# Patient Record
Sex: MALE | Race: AMERICAN INDIAN OR ALASKA NATIVE
[De-identification: names, ages, dates, MRNs, and addresses within clinical notes are randomized per-mention and may not be internally consistent; named-entity substitution may affect disease eponyms.]

---

## 2018-10-22 ENCOUNTER — HOSPITAL ENCOUNTER (EMERGENCY)
Dept: HOSPITAL 5 - ED | Age: 48
Discharge: HOME | End: 2018-10-22
Payer: SELF-PAY

## 2018-10-22 VITALS — DIASTOLIC BLOOD PRESSURE: 95 MMHG | SYSTOLIC BLOOD PRESSURE: 148 MMHG

## 2018-10-22 DIAGNOSIS — R10.12: Primary | ICD-10-CM

## 2018-10-22 DIAGNOSIS — R10.32: ICD-10-CM

## 2018-10-22 LAB
ALBUMIN SERPL-MCNC: 4.2 G/DL (ref 3.9–5)
ALT SERPL-CCNC: 14 UNITS/L (ref 7–56)
BACTERIA #/AREA URNS HPF: (no result) /HPF
BILIRUB UR QL STRIP: (no result)
BLOOD UR QL VISUAL: (no result)
BUN SERPL-MCNC: 13 MG/DL (ref 9–20)
BUN/CREAT SERPL: 10 %
CALCIUM SERPL-MCNC: 9.2 MG/DL (ref 8.4–10.2)
HCT VFR BLD CALC: 41.8 % (ref 35.5–45.6)
HEMOLYSIS INDEX: 6
HGB BLD-MCNC: 13.6 GM/DL (ref 11.8–15.2)
HYALINE CASTS #/AREA URNS LPF: 5 /LPF
MCH RBC QN AUTO: 30 PG (ref 28–32)
MCHC RBC AUTO-ENTMCNC: 33 % (ref 32–34)
MCV RBC AUTO: 93 FL (ref 84–94)
MUCOUS THREADS #/AREA URNS HPF: (no result) /HPF
PH UR STRIP: 5 [PH] (ref 5–7)
PLATELET # BLD: 218 K/MM3 (ref 140–440)
RBC # BLD AUTO: 4.5 M/MM3 (ref 3.65–5.03)
RBC #/AREA URNS HPF: 3 /HPF (ref 0–6)
UROBILINOGEN UR-MCNC: 2 MG/DL (ref ?–2)
WBC #/AREA URNS HPF: 1 /HPF (ref 0–6)

## 2018-10-22 PROCEDURE — 80053 COMPREHEN METABOLIC PANEL: CPT

## 2018-10-22 PROCEDURE — 99284 EMERGENCY DEPT VISIT MOD MDM: CPT

## 2018-10-22 PROCEDURE — 85027 COMPLETE CBC AUTOMATED: CPT

## 2018-10-22 PROCEDURE — 36415 COLL VENOUS BLD VENIPUNCTURE: CPT

## 2018-10-22 PROCEDURE — 74019 RADEX ABDOMEN 2 VIEWS: CPT

## 2018-10-22 PROCEDURE — 81001 URINALYSIS AUTO W/SCOPE: CPT

## 2018-10-22 NOTE — EMERGENCY DEPARTMENT REPORT
ED Abdominal Pain HPI





- General


Chief Complaint: Abdominal Pain


Stated Complaint: LEFT SIDE PAIN


Time Seen by Provider: 10/22/18 13:04


Source: patient


Mode of arrival: Ambulatory


Limitations: No Limitations





- History of Present Illness


MD Complaint: abdominal pain


Location: [facility]


Migration to: no migration


Severity: mild


Improves With: nothing


Worsens With: nothing


Associated Symptoms: nausea.  denies: vomiting, diarrhea, fever, chills, 

constipation, dysuria, hematemesis, hematochezia, melena, hematuria, anorexia





- Related Data


 Previous Rx's











 Medication  Instructions  Recorded  Last Taken  Type


 


Ondansetron [Zofran Odt] 4 mg PO Q6H PRN #10 tab.rapdis 10/22/18 Unknown Rx











 Allergies











Allergy/AdvReac Type Severity Reaction Status Date / Time


 


No Known Allergies Allergy   Unverified 10/22/18 12:01














ED Review of Systems


ROS: 


Stated complaint: LEFT SIDE PAIN


Other details as noted in HPI





Comment: All other systems reviewed and negative


Constitutional: denies: chills


Eyes: denies: eye pain


ENT: denies: throat pain


Respiratory: denies: cough


Cardiovascular: denies: chest pain


Endocrine: denies: excessive sweating


Gastrointestinal: as per HPI, abdominal pain, nausea.  denies: vomiting, 

diarrhea, constipation, hematemesis, melena


Genitourinary: denies: urgency, dysuria, frequency, hematuria, discharge


Musculoskeletal: denies: back pain


Skin: denies: rash, lesions


Neurological: denies: headache, weakness


Psychiatric: denies: anxiety, depression


Hematological/Lymphatic: denies: easy bleeding





ED Past Medical Hx





- Past Medical History


Previous Medical History?: No





- Surgical History


Past Surgical History?: No


Additional Surgical History: thyroid





- Family History


Family history: no significant





- Social History


Smoking Status: Never Smoker


Substance Use Type: None





- Medications


Home Medications: 


 Home Medications











 Medication  Instructions  Recorded  Confirmed  Last Taken  Type


 


Ondansetron [Zofran Odt] 4 mg PO Q6H PRN #10 tab.rapdis 10/22/18  Unknown Rx














ED Physical Exam





- General


Limitations: No Limitations


General appearance: alert





- Head


Head exam: Present: atraumatic





- Eye


Eye exam: Present: normal appearance





- ENT


ENT exam: Present: mucous membranes moist





- Neck


Neck exam: Present: normal inspection





- Respiratory


Respiratory exam: Present: normal lung sounds bilaterally





- Cardiovascular


Cardiovascular Exam: Present: regular rate, normal heart sounds





- GI/Abdominal


GI/Abdominal exam: Present: soft, normal bowel sounds.  Absent: distended, 

tenderness, guarding, rebound, rigid, diminished bowel sounds, hyperactive 

bowel sounds, hypoactive bowel sounds, organomegaly, mass, bruit, pulsatile mass

, hernia





- Rectal


Rectal exam: Present: deferred





- Extremities Exam


Extremities exam: Present: normal inspection





- Back Exam


Back exam: Present: normal inspection





- Neurological Exam


Neurological exam: Present: alert, oriented X3





- Psychiatric


Psychiatric exam: Present: normal affect, normal mood





- Skin


Skin exam: Present: warm, dry





ED Course


 Vital Signs











  10/22/18





  12:01


 


Temperature 97.9 F


 


Pulse Rate 85


 


Respiratory 18





Rate 


 


Blood Pressure 138/72


 


O2 Sat by Pulse 98





Oximetry 














- Reevaluation(s)


Reevaluation #1: 





10/22/18 18:01


Patient comes to the emergency room today.  Complaining of abdominal pain.  He 

points to his left upper and lower quadrant.  He also reports nausea without 

vomiting.  Denies diarrhea or constipation.  He reports a normal stool today.  

He is nontoxic.  Ambulatory.  Afebrile.  Vital signs are normal.  He's been in 

the ER most of the day and has been walking talking on the phone without 

distress.  Labs were completed and noted to be normal.  Urinalysis was normal.  

Abdominal series normal.  The patient has had no vomiting while in the 

emergency room.  He is taking by mouth here in the emergency room.  We'll refer 

patient for outpatient follow-up.





ED Medical Decision Making





- Lab Data


Result diagrams: 


 10/22/18 12:49





 10/22/18 12:49





- Medical Decision Making





LABS N


UA N


XRAY N


NO VOMITING


AMBULATORY


TAKING PO


GI COCKTAIL WO RELIEF





THEN PT SAYS MAYBE ITS NOT IN MY STOMACH


POOR INFORMANT


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





NON TOXIC





DC W DC POC AND FOLLOW UP





- Differential Diagnosis


RO K STONE/ COLITIS/ GASTROENTERITIS


Critical care attestation.: 


If time is entered above; I have spent that time in minutes in the direct care 

of this critically ill patient, excluding procedure time.








ED Disposition


Clinical Impression: 


 Abdominal pain





Disposition: DC-01 TO HOME OR SELFCARE


Is pt being admited?: No


Does the pt Need Aspirin: No


Condition: Stable


Instructions:  Acute Abdominal Pain (ED)


Additional Instructions: 


MEDS AS ORDERED





FOLLOW UP WITH GI


REFERRAL BELOW





BLAND DIET


BANANA 


RICE 


APPLESAUCE 


TOAST 


SLOWLY INCREASE DIET





ACTIVITY AS TOLERATED





PAY ATTENTION TO WHAT MAKES PAIN BETTER OR WORSE





ALL LABS AND URINALYSIS NORMAL TODAY. 


Prescriptions: 


Ondansetron [Zofran Odt] 4 mg PO Q6H PRN #10 tab.rapdis


 PRN Reason: Nausea


Referrals: 


PRIMARY CARE,MD [Primary Care Provider] - 3-5 Days


ROBINA CORTEZ MD [Staff Physician] - 3-5 Days


OSITO LEW MD [Staff Physician] - 3-5 Days


JAMES TORRES MD [Staff Physician] - 3-5 Days


KARUNA LAYTON MD [Staff Physician] - 3-5 Days


Time of Disposition: 17:52

## 2018-10-22 NOTE — XRAY REPORT
FINAL REPORT



EXAM:  XR ABDOMEN 2V



HISTORY:  ABD PAIN L SIDE 



TECHNIQUE:  Two views of the abdomen



Comparison: None 



FINDINGS:  

There is moderate fecal retention.



There are pelvic phleboliths.



There is dextroscoliosis lumbosacral spine.



No free air identified. 



No suspicious calcifications. 



Bilateral L5 pars interarticularis defects. 



SI joints are open. 



IMPRESSION:  

Moderate fecal retention.



Nonspecific bowel gas pattern.



No suspicious calcifications.



No free air.



No etiology for left-sided abdominal pain identified.